# Patient Record
Sex: FEMALE | Race: WHITE
[De-identification: names, ages, dates, MRNs, and addresses within clinical notes are randomized per-mention and may not be internally consistent; named-entity substitution may affect disease eponyms.]

---

## 2020-10-20 ENCOUNTER — HOSPITAL ENCOUNTER (OUTPATIENT)
Dept: HOSPITAL 75 - RAD | Age: 40
End: 2020-10-20
Attending: FAMILY MEDICINE
Payer: COMMERCIAL

## 2020-10-20 DIAGNOSIS — Z12.31: Primary | ICD-10-CM

## 2020-10-20 PROCEDURE — 77067 SCR MAMMO BI INCL CAD: CPT

## 2020-10-20 PROCEDURE — 77063 BREAST TOMOSYNTHESIS BI: CPT

## 2020-10-21 NOTE — DIAGNOSTIC IMAGING REPORT
EXAMINATION:

Digital mammogram bilateral screening with CAD.



INDICATION: 

Screening.



COMPARISON: 

This is the patient's baseline study.



PERSONAL HISTORY:

At this time, there are no current complaints.



FINDINGS:

There are scattered fibroglandular densities in both breasts

which could obscure a lesion. There is no primary or secondary

sign of malignancy noted.



IMPRESSION: 

1. There is no evidence for malignancy.

2. The patient should have her annual bilateral screening

mammogram on schedule in October 2021. 



ACR BI-RADS Category 1: Negative.

Result letter will be mailed to the patient.

Note:  At least 10% of breast cancer is not imaged by

mammography.



Dictated by: 



  Dictated on workstation # [id number]

## 2022-01-10 ENCOUNTER — HOSPITAL ENCOUNTER (OUTPATIENT)
Dept: HOSPITAL 75 - RAD | Age: 42
End: 2022-01-10
Attending: NURSE PRACTITIONER
Payer: COMMERCIAL

## 2022-01-10 DIAGNOSIS — R10.9: Primary | ICD-10-CM

## 2022-01-10 PROCEDURE — 74018 RADEX ABDOMEN 1 VIEW: CPT

## 2022-01-10 NOTE — DIAGNOSTIC IMAGING REPORT
INDICATION: Abdominal pain.



EXAMINATION: KUB at 03:26 p.m.



FINDINGS: Bowel gas pattern is normal. There are no pathologic

masses or calcifications.



IMPRESSION: No acute abnormalities in the abdomen.



Dictated by: 



  Dictated on workstation # [id number]

## 2022-02-18 ENCOUNTER — HOSPITAL ENCOUNTER (OUTPATIENT)
Dept: HOSPITAL 75 - RAD | Age: 42
End: 2022-02-18
Attending: NURSE PRACTITIONER
Payer: COMMERCIAL

## 2022-02-18 DIAGNOSIS — R10.9: Primary | ICD-10-CM

## 2022-02-18 PROCEDURE — 76705 ECHO EXAM OF ABDOMEN: CPT

## 2022-02-18 NOTE — DIAGNOSTIC IMAGING REPORT
EXAMINATION: US Abdomen limited.



TECHNIQUE: Multiple real-time grayscale images were obtained over

the right upper quadrant in various projections.



HISTORY: Abdominal pain.



COMPARISON: None available.



FINDINGS: 



The liver is normal in size. The liver is normal in echogenicity.

No focal lesions are seen. The portal vein is patent with

hepatopedal flow. Gallbladder is normal without wall thickening

or pericholecystic fluid. Sonographic Casper sign is positive.

Common duct measures 3 mm. There is no biliary ductal dilation.

The visualized portions of the pancreas are normal. The right

kidney is normal without hydronephrosis. 



IMPRESSION:



1. The patient reported a positive sonographic Casper sign, but

the gallbladder has a normal sonographic appearance.



Dictated by: 



  Dictated on workstation # [id number]

## 2022-02-28 ENCOUNTER — HOSPITAL ENCOUNTER (OUTPATIENT)
Dept: HOSPITAL 75 - RAD | Age: 42
End: 2022-02-28
Attending: STUDENT IN AN ORGANIZED HEALTH CARE EDUCATION/TRAINING PROGRAM
Payer: COMMERCIAL

## 2022-02-28 DIAGNOSIS — Z12.31: Primary | ICD-10-CM

## 2022-02-28 PROCEDURE — 77063 BREAST TOMOSYNTHESIS BI: CPT

## 2022-02-28 PROCEDURE — 77067 SCR MAMMO BI INCL CAD: CPT

## 2022-02-28 NOTE — DIAGNOSTIC IMAGING REPORT
INDICATION: 

Routine screening.



COMPARISON:    

10/20/2020.



TECHNIQUE: 

2D and 3D bilateral screening mammography was performed with CAD.



FINDINGS:

Scattered fibroglandular densities are identified bilaterally.

The parenchymal pattern is stable. No mass or malignant-appearing

microcalcifications are seen. The axillae are unremarkable.



IMPRESSION:   

No mammographic features suspicious for malignancy are

identified.



ACR BI-RADS Category 1: Negative.

Result letter will be mailed to the patient.

Note:  At least 10% of breast cancer is not imaged by

mammography.



Dictated by: 



  Dictated on workstation # [id number]

## 2022-04-04 ENCOUNTER — HOSPITAL ENCOUNTER (OUTPATIENT)
Dept: HOSPITAL 75 - CARD | Age: 42
End: 2022-04-04
Attending: NURSE PRACTITIONER
Payer: COMMERCIAL

## 2022-04-04 DIAGNOSIS — R10.13: ICD-10-CM

## 2022-04-04 DIAGNOSIS — R10.11: Primary | ICD-10-CM

## 2022-04-04 PROCEDURE — 78227 HEPATOBIL SYST IMAGE W/DRUG: CPT

## 2022-04-04 NOTE — DIAGNOSTIC IMAGING REPORT
INDICATION:  Right upper quadrant, epigastric abdominal pain.



FINDINGS: 

The patient was administered 4.90 mCi of Tc 99m Choletec and

sequential imaging was performed over the right upper abdomen.

There is progressive, homogeneous accumulation of radiotracer

within the liver parenchyma. There is filling of the bile ducts

and subsequent filling of the gallbladder. There is progressive

clearance of activity from the liver parenchyma and accumulation

of radiotracer within loops of small bowel.



The patient was then administered a fatty meal, utilizing 8

ounces of Ensure. The gallbladder ejection fraction was

calculated to be approximately 32.9%. (Normal values post fatty

meal stimulation are 33% or greater.)



IMPRESSION:

1. Hepatobiliary scan demonstrates a patent biliary tree.

2. Borderline gallbladder ejection fraction of approximately

32.9%.



Dictated by: 



  Dictated on workstation # [id number]
Normal for race